# Patient Record
Sex: MALE | Race: WHITE | NOT HISPANIC OR LATINO | URBAN - METROPOLITAN AREA
[De-identification: names, ages, dates, MRNs, and addresses within clinical notes are randomized per-mention and may not be internally consistent; named-entity substitution may affect disease eponyms.]

---

## 2017-03-14 DIAGNOSIS — I48.4 ATYPICAL ATRIAL FLUTTER (HCC): Chronic | ICD-10-CM

## 2017-03-14 RX ORDER — APIXABAN 5 MG/1
5 TABLET, FILM COATED ORAL 2 TIMES DAILY
Qty: 180 TAB | Refills: 3 | Status: SHIPPED | OUTPATIENT
Start: 2017-03-14 | End: 2017-12-15 | Stop reason: SDUPTHER

## 2017-04-04 ENCOUNTER — TELEPHONE (OUTPATIENT)
Dept: CARDIOLOGY | Facility: MEDICAL CENTER | Age: 67
End: 2017-04-04

## 2017-04-04 DIAGNOSIS — I48.92 ATRIAL FLUTTER, UNSPECIFIED TYPE (HCC): Chronic | ICD-10-CM

## 2017-04-04 NOTE — Clinical Note
April 10, 2017        Kyle Moya  [address] [place] [address]        Dear Kyle:    Your referral to cardiac electrophysiology has been sent to the office of:    [facility]  [phone]    If you are not contacted in a timely manner, please call [phone] to be scheduled. Thank you and have a good day.        Sincerely,            [facility]

## 2017-04-04 NOTE — TELEPHONE ENCOUNTER
----- Message from Gideon Lima, Med Ass't sent at 4/4/2017  8:36 AM PDT -----  Regarding: EP Referral  Contact: [phone]  Kyle Potter advised he is in Afib. He wants to see an EP Doctor in [place]. Her name is Angeline Blanchard and is at [facility].    He was wondering if Dr. Dawn can make a referral for him to see her.    He can be reached at: [phone].    Thank you so much,    Gideon

## 2017-04-10 NOTE — TELEPHONE ENCOUNTER
REFERRAL TO CARDIAC ELECTROPHYSIOLOGY        Emmie Murphy       Sent: Mon April 10, 2017 11:10 AM       Message        Patient has been referred to [facility].        If patient is not contacted in a timely manner, please contact [phone].       Thank You        Tried calling patient, no answer. Mailed patient a letter containing the above information.    VIKTOR GOMEZ

## 2017-10-24 ENCOUNTER — TELEPHONE (OUTPATIENT)
Dept: CARDIOLOGY | Facility: MEDICAL CENTER | Age: 67
End: 2017-10-24

## 2017-10-24 NOTE — TELEPHONE ENCOUNTER
----- Message from Nayana García sent at 10/24/2017 10:41 AM PDT -----  Regarding: patient needs order for echo  LA/Tammie    Patient needs echo before he has ablation on 11/13 in [place], [place]. Patient said he was pulling into our parking lot and on his way up to the office to  the order. He can be reached at [phone].

## 2017-11-20 DIAGNOSIS — I48.91 ATRIAL FIBRILLATION, UNSPECIFIED TYPE (HCC): ICD-10-CM

## 2017-11-20 RX ORDER — FLECAINIDE ACETATE 150 MG/1
150 TABLET ORAL 2 TIMES DAILY
Qty: 180 TAB | Refills: 3 | Status: SHIPPED | OUTPATIENT
Start: 2017-11-20 | End: 2018-06-25 | Stop reason: SDUPTHER

## 2017-12-11 ENCOUNTER — OFFICE VISIT (OUTPATIENT)
Dept: CARDIOLOGY | Facility: MEDICAL CENTER | Age: 67
End: 2017-12-11
Payer: COMMERCIAL

## 2017-12-11 VITALS
BODY MASS INDEX: 27.4 KG/M2 | DIASTOLIC BLOOD PRESSURE: 78 MMHG | OXYGEN SATURATION: 98 % | WEIGHT: 185 LBS | SYSTOLIC BLOOD PRESSURE: 136 MMHG | HEART RATE: 86 BPM | HEIGHT: 69 IN

## 2017-12-11 DIAGNOSIS — I48.3 TYPICAL ATRIAL FLUTTER (HCC): Chronic | ICD-10-CM

## 2017-12-11 PROCEDURE — 99214 OFFICE O/P EST MOD 30 MIN: CPT | Performed by: INTERNAL MEDICINE

## 2017-12-11 ASSESSMENT — ENCOUNTER SYMPTOMS
DIZZINESS: 0
PALPITATIONS: 1
WEIGHT LOSS: 0
EYES NEGATIVE: 1
BRUISES/BLEEDS EASILY: 0
COUGH: 0
PND: 0
LOSS OF CONSCIOUSNESS: 0
SHORTNESS OF BREATH: 0
NERVOUS/ANXIOUS: 0
NAUSEA: 0
HEARTBURN: 0
INSOMNIA: 0

## 2017-12-11 NOTE — PROGRESS NOTES
Subjective:   Kyle Moya is a 67 y.o. male who presents today In follow-up in regards to his persistent atrial fibrillation on antiarrhythmics    Had a repeat ablation in [place] last month  Has been chronically in A. fib since that time  Still tired, back on his flecainide  No trouble with anticoagulation    Past Medical History:   Diagnosis Date   • Atrial fibrillation (CMS-HCC) 6/26/2012   • Atrial flutter (CMS-HCC)    • Personal history of prior ablation treatment 4/15/11    AV ablation   • TIA (transient ischemic attack) 11/30/2015     Past Surgical History:   Procedure Laterality Date   • LASER ABLATION      for fibrillation.   • OTHER CARDIAC SURGERY      CARDIOVERSION TWICE.     Family History   Problem Relation Age of Onset   • Heart Attack Neg Hx    • Heart Disease Neg Hx    • Heart Failure Neg Hx      History   Smoking Status   • Never Smoker   Smokeless Tobacco   • Never Used     Allergies   Allergen Reactions   • Nkda [No Known Drug Allergy]      Outpatient Encounter Prescriptions as of 12/11/2017   Medication Sig Dispense Refill   • flecainide (TAMBOCOR) 150 MG Tab Take 1 Tab by mouth 2 times a day. 180 Tab 3   • ELIQUIS 5 MG Tab Take 1 Tab by mouth 2 Times a Day. 180 Tab 3   • Acetylcarnitine HCl (ACETYL L-CARNITINE) 500 MG Cap Take  by mouth.     • Coenzyme Q10 (CO Q 10 PO) Take  by mouth.     • B Complex Vitamins (VITAMIN B COMPLEX PO) Take  by mouth.     • MAGNESIUM PO Take  by mouth.     • Probiotic Product (PROBIOTIC DAILY PO) Take  by mouth.     • zolpidem (AMBIEN) 5 MG TABS Take 5 mg by mouth at bedtime as needed.     • ascorbic acid (ASCORBIC ACID) 500 MG TABS Take 500 mg by mouth every day.     • Flaxseed, Linseed, (FLAX SEED OIL) 1000 MG CAPS Take  by mouth every day.     • docosahexanoic acid (OMEGA 3 FA) 1000 MG CAPS Take 1,000 mg by mouth every day.     • vitamin D (CHOLECALCIFEROL) 1000 UNIT TABS Take 1,000 Units by mouth 4 times a day.     • [DISCONTINUED] ASPIRIN LOW DOSE 81  "MG EC tablet      • ALPHA LIPOIC ACID PO Take 100 mg by mouth every day.       No facility-administered encounter medications on file as of 12/11/2017.      Review of Systems   Constitutional: Positive for malaise/fatigue. Negative for weight loss.   Eyes: Negative.    Respiratory: Negative for cough and shortness of breath.    Cardiovascular: Positive for palpitations. Negative for chest pain, leg swelling and PND.   Gastrointestinal: Negative for heartburn and nausea.   Neurological: Negative for dizziness and loss of consciousness.   Endo/Heme/Allergies: Does not bruise/bleed easily.   Psychiatric/Behavioral: The patient is not nervous/anxious and does not have insomnia.    All other systems reviewed and are negative.       Objective:   /78   Pulse 86   Ht 1.753 m (5' 9\")   Wt 83.9 kg (185 lb)   SpO2 98%   BMI 27.32 kg/m²     Physical Exam   Constitutional: He is oriented to person, place, and time. He appears well-developed. No distress.   HENT:   Mouth/Throat: Mucous membranes are normal.   Eyes: Conjunctivae and EOM are normal.   Neck: No JVD present. No tracheal deviation present. No thyroid mass and no thyromegaly present.   Cardiovascular: Normal rate and intact distal pulses.    No murmur heard.  Irregular in the 80s   Pulmonary/Chest: Effort normal and breath sounds normal. No respiratory distress. He exhibits no tenderness.   Abdominal: Bowel sounds are normal. He exhibits no distension.   Musculoskeletal: Normal range of motion.   Neurological: He is alert and oriented to person, place, and time. He has normal strength. He displays no tremor.   Skin: Skin is warm and dry. No rash noted. He is not diaphoretic.   Psychiatric: He has a normal mood and affect. His behavior is normal.   Vitals reviewed.      Assessment:     1. Typical atrial flutter (CMS-HCC)         Medical Decision Making:  Today's Assessment / Status / Plan:       Recurrent atrial fib/flutter  Irregular on exam " today  Symptomatic  Has a follow-up with them in 2 months    To cardioversion, I be happy to help  History of stroke will be on anticoagulation    Long discussion about fibrillation, his attitude is excellent    RTC as needed based on above

## 2017-12-15 ENCOUNTER — TELEPHONE (OUTPATIENT)
Dept: CARDIOLOGY | Facility: MEDICAL CENTER | Age: 67
End: 2017-12-15

## 2017-12-15 DIAGNOSIS — I48.4 ATYPICAL ATRIAL FLUTTER (HCC): Chronic | ICD-10-CM

## 2017-12-15 RX ORDER — APIXABAN 5 MG/1
5 TABLET, FILM COATED ORAL 2 TIMES DAILY
Qty: 180 TAB | Refills: 3 | Status: SHIPPED | OUTPATIENT
Start: 2017-12-15 | End: 2019-01-02 | Stop reason: SDUPTHER

## 2017-12-15 NOTE — TELEPHONE ENCOUNTER
Spoke with  Cari in the office. The patient told her that he needs to schedule a cardioversion at [facility], but the cardioversion was ordered by his cardiologist in [place].    Reviewed chart. Per Dr. Dawn's office note 12/11/2017:      Recurrent atrial fib/flutter  Irregular on exam today  Symptomatic  Has a follow-up with them in 2 months     To cardioversion, I be happy to help  History of stroke will be on anticoagulation     Long discussion about fibrillation, his attitude is excellent    Called patient. He was told by his other cardiologist Dr. Angeline Blanchard that he needs to have a cardioversion, and he would like to have the procedure done at [facility].    Sent a message to the procedure  regarding the above.    VIKTOR GOMEZ

## 2017-12-15 NOTE — TELEPHONE ENCOUNTER
Spoke with the procedure  Shaniqua. Patient's insurance requires that he has procedures done at [facility].    Called patient and advised him of the above. Patient verbalized understanding, he is going to contact his other cardiologist's office to have his cardioversion scheduled through them.    VIKTOR GOMEZ

## 2018-06-25 ENCOUNTER — OFFICE VISIT (OUTPATIENT)
Dept: CARDIOLOGY | Facility: MEDICAL CENTER | Age: 68
End: 2018-06-25
Payer: COMMERCIAL

## 2018-06-25 ENCOUNTER — TELEPHONE (OUTPATIENT)
Dept: CARDIOLOGY | Facility: MEDICAL CENTER | Age: 68
End: 2018-06-25

## 2018-06-25 VITALS
SYSTOLIC BLOOD PRESSURE: 100 MMHG | WEIGHT: 180.4 LBS | HEIGHT: 69 IN | HEART RATE: 94 BPM | BODY MASS INDEX: 26.72 KG/M2 | DIASTOLIC BLOOD PRESSURE: 62 MMHG | OXYGEN SATURATION: 94 %

## 2018-06-25 DIAGNOSIS — G45.9 TRANSIENT CEREBRAL ISCHEMIA, UNSPECIFIED TYPE: ICD-10-CM

## 2018-06-25 DIAGNOSIS — F51.01 PRIMARY INSOMNIA: ICD-10-CM

## 2018-06-25 DIAGNOSIS — I48.91 ATRIAL FIBRILLATION, UNSPECIFIED TYPE (HCC): ICD-10-CM

## 2018-06-25 DIAGNOSIS — I48.3 TYPICAL ATRIAL FLUTTER (HCC): Chronic | ICD-10-CM

## 2018-06-25 LAB — EKG IMPRESSION: NORMAL

## 2018-06-25 PROCEDURE — 93000 ELECTROCARDIOGRAM COMPLETE: CPT | Performed by: INTERNAL MEDICINE

## 2018-06-25 PROCEDURE — 99214 OFFICE O/P EST MOD 30 MIN: CPT | Performed by: INTERNAL MEDICINE

## 2018-06-25 RX ORDER — FLECAINIDE ACETATE 150 MG/1
150 TABLET ORAL 2 TIMES DAILY
Qty: 60 TAB | Refills: 0 | Status: SHIPPED | OUTPATIENT
Start: 2018-06-25

## 2018-06-25 RX ORDER — ZOLPIDEM TARTRATE 5 MG/1
5 TABLET ORAL NIGHTLY PRN
Qty: 30 TAB | Refills: 1 | Status: SHIPPED | OUTPATIENT
Start: 2018-06-25 | End: 2018-07-25

## 2018-06-25 RX ORDER — FLECAINIDE ACETATE 150 MG/1
150 TABLET ORAL 2 TIMES DAILY
Qty: 180 TAB | Refills: 3 | Status: SHIPPED | OUTPATIENT
Start: 2018-06-25 | End: 2018-06-25 | Stop reason: SDUPTHER

## 2018-06-25 ASSESSMENT — ENCOUNTER SYMPTOMS
PND: 0
INSOMNIA: 0
BRUISES/BLEEDS EASILY: 0
SHORTNESS OF BREATH: 0
COUGH: 0
EYES NEGATIVE: 1
PALPITATIONS: 0
HEARTBURN: 0
LOSS OF CONSCIOUSNESS: 0
NERVOUS/ANXIOUS: 0
NAUSEA: 0
DIZZINESS: 0
WEIGHT LOSS: 0

## 2018-06-25 NOTE — TELEPHONE ENCOUNTER
----- Message -----   From: Erma Dawn M.D.   Sent: 6/25/2018  12:46 PM   To: Maria Victoria Owen, Med Ass't   Subject: RE: Dizziness                                     Got  it!   I just called him, he is still in A. fib which may explain it.  Thanks so much       ----- Message -----   From: Maria Victoria Owen, Med Ass't   Sent: 6/25/2018  11:11 AM   To: Katherine Sutton R.N., Erma Dawn M.D.   Subject: Dizziness                                         Pt asked a question while leaving today that he forgot to ask you. PT wakes up in the morning and is dizzy for about 5 seconds and then it goes away. He is wondering if it is possibly vertigo or a blockage in his carotid. Please advise   Thank you   Maria Victoria [place] Nita is wanting the flecainide after all. Dr. Dawn had talked about it. He stated he is back in Afib.  He will return around 1pm.     ===================================================================    Discussed w/ Dr Dawn, per Dr Dawn pt ok to go back to Flecainide 150mg PO BID.     New Rx for Flecainide sent to [facility]     Called pt and notified, pt appreciative and verbalizes understanding, he requested a short script to be [facility].    [facility] to [facility] per pt request

## 2018-06-25 NOTE — PROGRESS NOTES
Subjective:   Kyle Moya is a 67 y.o. male who presents today In follow-up in regards to his persistent atrial fibrillation     doing well since I saw him last.  Seen by his [place] electrophysiologist/mahogany  Planning to move to [facility]  Stop taking his antiarrhythmic, no limitations, doing very well    Past Medical History:   Diagnosis Date   • Atrial fibrillation (HCC) 6/26/2012   • Atrial flutter (HCC)    • Personal history of prior ablation treatment 4/15/11    AV ablation   • TIA (transient ischemic attack) 11/30/2015     Past Surgical History:   Procedure Laterality Date   • LASER ABLATION      for fibrillation.   • OTHER CARDIAC SURGERY      CARDIOVERSION TWICE.     Family History   Problem Relation Age of Onset   • Heart Attack Neg Hx    • Heart Disease Neg Hx    • Heart Failure Neg Hx      History   Smoking Status   • Never Smoker   Smokeless Tobacco   • Never Used     Allergies   Allergen Reactions   • Nkda [No Known Drug Allergy]      Outpatient Encounter Prescriptions as of 6/25/2018   Medication Sig Dispense Refill   • zolpidem (AMBIEN) 5 MG Tab Take 1 Tab by mouth at bedtime as needed for Sleep for up to 30 days. 30 Tab 1   • ELIQUIS 5 MG Tab Take 1 Tab by mouth 2 Times a Day. 180 Tab 3   • Acetylcarnitine HCl (ACETYL L-CARNITINE) 500 MG Cap Take  by mouth.     • Coenzyme Q10 (CO Q 10 PO) Take  by mouth.     • B Complex Vitamins (VITAMIN B COMPLEX PO) Take  by mouth.     • ascorbic acid (ASCORBIC ACID) 500 MG TABS Take 500 mg by mouth every day.     • Flaxseed, Linseed, (FLAX SEED OIL) 1000 MG CAPS Take  by mouth every day.     • docosahexanoic acid (OMEGA 3 FA) 1000 MG CAPS Take 1,000 mg by mouth every day.     • vitamin D (CHOLECALCIFEROL) 1000 UNIT TABS Take 1,000 Units by mouth 4 times a day.     • flecainide (TAMBOCOR) 150 MG Tab Take 1 Tab by mouth 2 times a day. (Patient not taking: Reported on 6/25/2018) 180 Tab 3   • Probiotic Product (PROBIOTIC DAILY PO) Take  by mouth.     •  "[DISCONTINUED] MAGNESIUM PO Take  by mouth.     • zolpidem (AMBIEN) 5 MG TABS Take 5 mg by mouth at bedtime as needed.     • ALPHA LIPOIC ACID PO Take 100 mg by mouth every day.       No facility-administered encounter medications on file as of 6/25/2018.      Review of Systems   Constitutional: Negative for malaise/fatigue and weight loss.   HENT: Negative.    Eyes: Negative.    Respiratory: Negative for cough and shortness of breath.    Cardiovascular: Negative for chest pain, palpitations, leg swelling and PND.   Gastrointestinal: Negative for heartburn and nausea.   Neurological: Negative for dizziness and loss of consciousness.   Endo/Heme/Allergies: Does not bruise/bleed easily.   Psychiatric/Behavioral: The patient is not nervous/anxious and does not have insomnia.    All other systems reviewed and are negative.       Objective:   /62   Pulse 94   Ht 1.753 m (5' 9\")   Wt 81.8 kg (180 lb 6.4 oz)   SpO2 94%   BMI 26.64 kg/m²     Physical Exam   Constitutional: He is oriented to person, place, and time. He appears well-developed and well-nourished.   HENT:   Head: Normocephalic and atraumatic.   Mouth/Throat: Mucous membranes are normal.   Eyes: EOM are normal. Pupils are equal, round, and reactive to light.   Neck: No JVD present. No thyroid mass and no thyromegaly present.   Cardiovascular: Normal rate and intact distal pulses.    No murmur heard.  Irregular in the 80s   Pulmonary/Chest: Effort normal and breath sounds normal. No respiratory distress. He exhibits no tenderness.   Abdominal: Bowel sounds are normal. He exhibits no distension.   Musculoskeletal: Normal range of motion.   Neurological: He is alert and oriented to person, place, and time. He has normal strength. He displays no tremor.   Skin: Skin is warm and dry. No rash noted.   Psychiatric: He has a normal mood and affect. His behavior is normal.   Vitals reviewed.      Assessment:     1. Typical atrial flutter (HCC)  [facility] EPIPHANY " EKG (Clinic Performed)   2. Transient cerebral ischemia, unspecified type     3. Primary insomnia  zolpidem (AMBIEN) 5 MG Tab       Medical Decision Making:  Today's Assessment / Status / Plan:       Recurrent atrial fib/flutter  Irregular on exam today -again, twelve-lead EKG read by me confirms atrial fibrillation with rates in the 90s  Asymptomatic but question history of TIA  Continue anticoagulation, he will contact his electrophysiologist, be happy to cardiovert him and told him so  Very reasonable to resume his antiarrhythmic discussed    History of stroke will be on anticoagulation  Discussed this at length and he agrees  Long discussion about fibrillation, his attitude is excellent    RTC as needed based on above, or with electrophysiology or in [place]    Physical Exam   Constitutional: He is oriented to person, place, and time. He appears well-developed and well-nourished.   HENT:   Head: Normocephalic and atraumatic.   Mouth/Throat: Mucous membranes are normal.   Eyes: EOM are normal. Pupils are equal, round, and reactive to light.   Neck: No JVD present. No thyroid mass and no thyromegaly present.   Cardiovascular: Normal rate and intact distal pulses.    No murmur heard.  Irregular in the 80s   Pulmonary/Chest: Effort normal and breath sounds normal. No respiratory distress. He exhibits no tenderness.   Abdominal: Bowel sounds are normal. He exhibits no distension.   Musculoskeletal: Normal range of motion.   Neurological: He is alert and oriented to person, place, and time. He has normal strength. He displays no tremor.   Skin: Skin is warm and dry. No rash noted.   Psychiatric: He has a normal mood and affect. His behavior is normal.   Vitals reviewed.

## 2018-06-25 NOTE — LETTER
[facility]   [address], [address]  [place] [address]  Phone: [phone]  Fax: [phone]              Kyle Moya  1950    Encounter Date: 6/25/2018    Erma Dawn M.D.          PROGRESS NOTE:  Subjective:   Kyle Moya is a 67 y.o. male who presents today In follow-up in regards to his persistent atrial fibrillation     doing well since I saw him last.  Seen by his [place] electrophysiologist/mahogany  Planning to move to [facility]  Stop taking his antiarrhythmic, no limitations, doing very well    Past Medical History:   Diagnosis Date   • Atrial fibrillation (HCC) 6/26/2012   • Atrial flutter (HCC)    • Personal history of prior ablation treatment 4/15/11    AV ablation   • TIA (transient ischemic attack) 11/30/2015     Past Surgical History:   Procedure Laterality Date   • LASER ABLATION      for fibrillation.   • OTHER CARDIAC SURGERY      CARDIOVERSION TWICE.     Family History   Problem Relation Age of Onset   • Heart Attack Neg Hx    • Heart Disease Neg Hx    • Heart Failure Neg Hx      History   Smoking Status   • Never Smoker   Smokeless Tobacco   • Never Used     Allergies   Allergen Reactions   • Nkda [No Known Drug Allergy]      Outpatient Encounter Prescriptions as of 6/25/2018   Medication Sig Dispense Refill   • zolpidem (AMBIEN) 5 MG Tab Take 1 Tab by mouth at bedtime as needed for Sleep for up to 30 days. 30 Tab 1   • ELIQUIS 5 MG Tab Take 1 Tab by mouth 2 Times a Day. 180 Tab 3   • Acetylcarnitine HCl (ACETYL L-CARNITINE) 500 MG Cap Take  by mouth.     • Coenzyme Q10 (CO Q 10 PO) Take  by mouth.     • B Complex Vitamins (VITAMIN B COMPLEX PO) Take  by mouth.     • ascorbic acid (ASCORBIC ACID) 500 MG TABS Take 500 mg by mouth every day.     • Flaxseed, Linseed, (FLAX SEED OIL) 1000 MG CAPS Take  by mouth every day.     • docosahexanoic acid (OMEGA 3 FA) 1000 MG CAPS Take 1,000 mg by mouth every day.     • vitamin D  "(CHOLECALCIFEROL) 1000 UNIT TABS Take 1,000 Units by mouth 4 times a day.     • flecainide (TAMBOCOR) 150 MG Tab Take 1 Tab by mouth 2 times a day. (Patient not taking: Reported on 6/25/2018) 180 Tab 3   • Probiotic Product (PROBIOTIC DAILY PO) Take  by mouth.     • [DISCONTINUED] MAGNESIUM PO Take  by mouth.     • zolpidem (AMBIEN) 5 MG TABS Take 5 mg by mouth at bedtime as needed.     • ALPHA LIPOIC ACID PO Take 100 mg by mouth every day.       No facility-administered encounter medications on file as of 6/25/2018.      Review of Systems   Constitutional: Negative for malaise/fatigue and weight loss.   HENT: Negative.    Eyes: Negative.    Respiratory: Negative for cough and shortness of breath.    Cardiovascular: Negative for chest pain, palpitations, leg swelling and PND.   Gastrointestinal: Negative for heartburn and nausea.   Neurological: Negative for dizziness and loss of consciousness.   Endo/Heme/Allergies: Does not bruise/bleed easily.   Psychiatric/Behavioral: The patient is not nervous/anxious and does not have insomnia.    All other systems reviewed and are negative.       Objective:   /62   Pulse 94   Ht 1.753 m (5' 9\")   Wt 81.8 kg (180 lb 6.4 oz)   SpO2 94%   BMI 26.64 kg/m²      Physical Exam   Constitutional: He is oriented to person, place, and time. He appears well-developed and well-nourished.   HENT:   Head: Normocephalic and atraumatic.   Mouth/Throat: Mucous membranes are normal.   Eyes: EOM are normal. Pupils are equal, round, and reactive to light.   Neck: No JVD present. No thyroid mass and no thyromegaly present.   Cardiovascular: Normal rate and intact distal pulses.    No murmur heard.  Irregular in the 80s   Pulmonary/Chest: Effort normal and breath sounds normal. No respiratory distress. He exhibits no tenderness.   Abdominal: Bowel sounds are normal. He exhibits no distension.   Musculoskeletal: Normal range of motion.   Neurological: He is alert and oriented to person, " place, and time. He has normal strength. He displays no tremor.   Skin: Skin is warm and dry. No rash noted.   Psychiatric: He has a normal mood and affect. His behavior is normal.   Vitals reviewed.      Assessment:     1. Typical atrial flutter (HCC)  [facility] EPIPHANY EKG (Clinic Performed)   2. Transient cerebral ischemia, unspecified type     3. Primary insomnia  zolpidem (AMBIEN) 5 MG Tab       Medical Decision Making:  Today's Assessment / Status / Plan:       Recurrent atrial fib/flutter  Irregular on exam today -again, twelve-lead EKG read by me confirms atrial fibrillation with rates in the 90s  Asymptomatic but question history of TIA  Continue anticoagulation, he will contact his electrophysiologist, be happy to cardiovert him and told him so  Very reasonable to resume his antiarrhythmic discussed    History of stroke will be on anticoagulation  Discussed this at length and he agrees  Long discussion about fibrillation, his attitude is excellent    RTC as needed based on above, or with electrophysiology or in [place]    Physical Exam   Constitutional: He is oriented to person, place, and time. He appears well-developed and well-nourished.   HENT:   Head: Normocephalic and atraumatic.   Mouth/Throat: Mucous membranes are normal.   Eyes: EOM are normal. Pupils are equal, round, and reactive to light.   Neck: No JVD present. No thyroid mass and no thyromegaly present.   Cardiovascular: Normal rate and intact distal pulses.    No murmur heard.  Irregular in the 80s   Pulmonary/Chest: Effort normal and breath sounds normal. No respiratory distress. He exhibits no tenderness.   Abdominal: Bowel sounds are normal. He exhibits no distension.   Musculoskeletal: Normal range of motion.   Neurological: He is alert and oriented to person, place, and time. He has normal strength. He displays no tremor.   Skin: Skin is warm and dry. No rash noted.   Psychiatric: He has a normal mood and affect. His behavior is normal.      Vitals reviewed.          Angeline Blanchard M.D.  [address] [place] [address]  [facility] [address]  VIA Facsimile: [phone]

## 2018-11-20 ENCOUNTER — TELEPHONE (OUTPATIENT)
Dept: CARDIOLOGY | Facility: MEDICAL CENTER | Age: 68
End: 2018-11-20

## 2018-11-20 DIAGNOSIS — G47.30 SLEEP APNEA, UNSPECIFIED TYPE: ICD-10-CM

## 2018-11-20 DIAGNOSIS — G45.9 TIA (TRANSIENT ISCHEMIC ATTACK): ICD-10-CM

## 2018-11-20 DIAGNOSIS — I48.3 TYPICAL ATRIAL FLUTTER (HCC): Chronic | ICD-10-CM

## 2019-01-02 DIAGNOSIS — I48.4 ATYPICAL ATRIAL FLUTTER (HCC): Chronic | ICD-10-CM

## 2019-01-02 RX ORDER — APIXABAN 5 MG/1
5 TABLET, FILM COATED ORAL 2 TIMES DAILY
Qty: 180 TAB | Refills: 2 | Status: SHIPPED | OUTPATIENT
Start: 2019-01-02

## 2022-01-24 NOTE — TELEPHONE ENCOUNTER
----- Message from Tammi Gonzalez sent at 11/20/2018  2:34 PM PST -----  Regarding: referral to new cardiologist  Contact: [phone]  LA/caity    Pt calling to ask LA for a referral to the new cardiologist in [facility] where he recently has relocated to.  The [place] cardiologist will, upon receipt of LA's referral, reach out to [facility] for pt's medical records.  All they need at this time is the referral.  Please call Kyle for all details, [phone].  
Pt needs referral to:    [facility]  Attn Dr Waleska Frias  [address]  [address]  [place], [place] [address]     [phone]  [phone]      Done.     
normal (ped)...